# Patient Record
Sex: FEMALE | Race: WHITE | NOT HISPANIC OR LATINO | Employment: UNEMPLOYED | URBAN - METROPOLITAN AREA
[De-identification: names, ages, dates, MRNs, and addresses within clinical notes are randomized per-mention and may not be internally consistent; named-entity substitution may affect disease eponyms.]

---

## 2021-11-18 ENCOUNTER — HISTORICAL (OUTPATIENT)
Dept: ADMINISTRATIVE | Facility: HOSPITAL | Age: 46
End: 2021-11-18

## 2021-11-18 LAB
ABS NEUT (OLG): 3.74 X10(3)/MCL (ref 2.1–9.2)
BASOPHILS # BLD AUTO: 0 X10(3)/MCL (ref 0–0.2)
BASOPHILS NFR BLD AUTO: 1 %
EOSINOPHIL # BLD AUTO: 0.2 X10(3)/MCL (ref 0–0.9)
EOSINOPHIL NFR BLD AUTO: 2 %
ERYTHROCYTE [DISTWIDTH] IN BLOOD BY AUTOMATED COUNT: 15.1 % (ref 11.5–14.5)
FERRITIN SERPL-MCNC: 80.38 NG/ML (ref 4.63–204)
HCT VFR BLD AUTO: 40.4 % (ref 35–46)
HGB BLD-MCNC: 12.7 GM/DL (ref 12–16)
IMM GRANULOCYTES # BLD AUTO: 0.02 10*3/UL
IMM GRANULOCYTES NFR BLD AUTO: 0 %
LYMPHOCYTES # BLD AUTO: 3.3 X10(3)/MCL (ref 0.6–4.6)
LYMPHOCYTES NFR BLD AUTO: 42 %
MCH RBC QN AUTO: 28.7 PG (ref 26–34)
MCHC RBC AUTO-ENTMCNC: 31.4 GM/DL (ref 31–37)
MCV RBC AUTO: 91.4 FL (ref 80–100)
MONOCYTES # BLD AUTO: 0.6 X10(3)/MCL (ref 0.1–1.3)
MONOCYTES NFR BLD AUTO: 8 %
NEUTROPHILS # BLD AUTO: 3.74 X10(3)/MCL (ref 2.1–9.2)
NEUTROPHILS NFR BLD AUTO: 47 %
NRBC BLD AUTO-RTO: 0 % (ref 0–0.2)
PLATELET # BLD AUTO: 283 X10(3)/MCL (ref 130–400)
PMV BLD AUTO: 9.3 FL (ref 7.4–10.4)
RBC # BLD AUTO: 4.42 X10(6)/MCL (ref 4–5.2)
WBC # SPEC AUTO: 7.9 X10(3)/MCL (ref 4.5–11)

## 2021-12-28 ENCOUNTER — HISTORICAL (OUTPATIENT)
Dept: GASTROENTEROLOGY | Facility: CLINIC | Age: 46
End: 2021-12-28

## 2021-12-28 LAB — SARS-COV-2 AG RESP QL IA.RAPID: NEGATIVE

## 2021-12-30 ENCOUNTER — HISTORICAL (OUTPATIENT)
Dept: ENDOSCOPY | Facility: HOSPITAL | Age: 46
End: 2021-12-30

## 2022-01-11 ENCOUNTER — HISTORICAL (OUTPATIENT)
Dept: CARDIOLOGY | Facility: HOSPITAL | Age: 47
End: 2022-01-11

## 2022-03-28 ENCOUNTER — HISTORICAL (OUTPATIENT)
Dept: CARDIOLOGY | Facility: HOSPITAL | Age: 47
End: 2022-03-28

## 2022-03-28 LAB — SARS-COV-2 AG RESP QL IA.RAPID: NEGATIVE

## 2022-04-10 ENCOUNTER — HISTORICAL (OUTPATIENT)
Dept: ADMINISTRATIVE | Facility: HOSPITAL | Age: 47
End: 2022-04-10
Payer: MEDICAID

## 2022-04-27 VITALS
SYSTOLIC BLOOD PRESSURE: 127 MMHG | OXYGEN SATURATION: 98 % | BODY MASS INDEX: 35.05 KG/M2 | WEIGHT: 178.56 LBS | HEIGHT: 60 IN | DIASTOLIC BLOOD PRESSURE: 78 MMHG

## 2022-05-03 NOTE — HISTORICAL [facility]ER
This is a historical note converted from Dorina. Formatting and pictures may have been removed.  Please reference Dorina for original formatting and attached multimedia. Chief Complaint  est care  History of Present Illness  45-year-old white female presents to establish care in the [facility]?accompanied by her cousin. ?She has a past medical history of IV drug abuse,?Hep C (undetectable), latent syphilis,?tobacco use and was recently hospitalized?with?endocarditis and spinal osteomyelitis.? Patient was discharged from [facility]?on?October 18, 2021 on was then placed in an LTAC facility.? Quit drug use?about 8 months ago, but still smoking about 10 cigarettes/day.  Review of Systems  ????Constitutional: No fever, No chills, No weakness, No fatigue.  ?????Eye: No recent visual problem.  ?????Respiratory: No shortness of breath, No cough, No sputum production, No wheezing.  ?????Cardiovascular: No chest pain, No palpitations, No peripheral edema.  ?????Gastrointestinal: No nausea, No vomiting, No diarrhea, No constipation, No heartburn, No abdominal pain.  ?????Genitourinary: No dysuria.  ?????Endocrine: No excessive thirst, No polyuria, No cold intolerance, No heat intolerance.  ?????Musculoskeletal: No back pain, No joint pain, No decreased range of motion.  ?????Integumentary: No rash, No pruritus.  ?????Neurologic: Alert and oriented X4, No numbness, No tingling, No headache.  ?????Psychiatric: No anxiety, No depression.  Physical Exam  Vitals & Measurements  T:?36.8? ?C (Oral)? HR:?88(Peripheral)? RR:?18? BP:?108/74? SpO2:?97%?  HT:?152.40?cm? WT:?80.300?kg? BMI:?34.57?  General: Alert and oriented, No acute distress.  ?????Eye: Pupils are equal, round and reactive to light, Extraocular movements are intact, Normal conjunctiva.  ?????HENT: Normocephalic, Oral mucosa is moist, No pharyngeal erythema.  ?????Neck: Supple, Non-tender.  ?????Respiratory: Lungs are clear to  auscultation, Respirations are non-labored.  ?????Cardiovascular: Normal rate, Regular rhythm, No murmur, Good pulses equal in all extremities, No edema.  ?????Gastrointestinal: Soft, Non-tender, Non-distended, Normal bowel sounds.  ?????Musculoskeletal: Normal range of motion, Normal strength.  ?????Integumentary: Warm, Dry.  ?????Neurologic: Alert, Oriented.  ?????Cognition and Speech: Oriented, Speech clear and coherent.  ?????Psychiatric: Cooperative, Appropriate mood & affect.  Assessment/Plan  Family history of colon cancer?Z80.0  Orders:  CBC w/ Auto Diff, Routine collect, *Est. 11/18/21 3:00:00 CST, Blood, Order for future visit, *Est. Stop date 11/18/21 3:00:00 CST, Lab Collect, Microcytic hypochromic anemia, 11/18/21 9:19:00 CST  CBC w/ Auto Diff, Routine collect, *Est. 02/18/22 3:00:00 CST, Blood, Order for future visit, *Est. Stop date 02/18/22 3:00:00 CST, Lab Collect, Wellness examination  Screening cholesterol level  History of endocarditis  Tobacco use  Osteomyelitis of spine  IV citlalli...  Clinic Follow up, *Est. 02/18/22 3:00:00 CST, FASTING labs, Order for future visit, Wellness examination  Screening cholesterol level  History of endocarditis  Tobacco use  Osteomyelitis of spine  IV drug abuse  Clinic Follow up, *Est. 02/24/22 3:00:00 CST, Order for future visit, Wellness examination  Screening cholesterol level  History of endocarditis  Tobacco use  Osteomyelitis of spine  IV drug abuse  Comprehensive Metabolic Panel, Routine collect, *Est. 02/18/22 3:00:00 CST, Blood, Order for future visit, *Est. Stop date 02/18/22 3:00:00 CST, Lab Collect, Wellness examination  Screening cholesterol level  History of endocarditis  Tobacco use  Osteomyelitis of spine  IV citlalli...  Ferritin, Routine collect, *Est. 11/18/21 3:00:00 CST, Blood, Order for future visit, *Est. Stop date 11/18/21 3:00:00 CST, Lab Collect, Microcytic hypochromic anemia, 11/18/21 9:19:00 CST  Lipid Panel, Routine  collect, *Est. 02/18/22 3:00:00 CST, Blood, Order for future visit, *Est. Stop date 02/18/22 3:00:00 CST, Lab Collect, Wellness examination  Screening cholesterol level  History of endocarditis  Tobacco use  Osteomyelitis of spine  IV citlalli...  Urinalysis with Microscopic if Indicated, Routine collect, Urine, Order for future visit, *Est. 02/18/22 3:00:00 CST, *Est. Stop date 02/18/22 3:00:00 CST, Nurse collect, Wellness examination  Screening cholesterol level  History of endocarditis  Tobacco use  Osteomyelitis of spine  IV d...  ?  1.? Tobacco use:? Advised on cessation.  2.? History of IV drug abuse:? Encouraged abstinence.  3.? Recent history of endocarditis and spinal osteomyelitis:? Will defer to ID, with whom she has an appointment on December 9, 2021.  Urgent referral to cardiology clinic.  4.? Wellness, screening cholesterol:? Fasting labs 1 week prior to follow up in 3 months.  5.? Microcytic Hypochromic Anemia:? CBC with diff, iron studies today.  6.? First degree family history of colon cancer:? Referral to GI lab for colonoscopy.  ?  Patient voiced understanding.  Referrals  [facility] Internal Referral to Cardiology Clinic, Specialty: Cardiology, Reason: Recent history of endocarditis (Oct. 2021), Refer To: Dickson BARRERA, Zaid EASLEY, [facility], [address] [place] [id number]., Start: 11/18/21 9:38:00 CST, Urgent  [facility] Internal Referral to GI Procedure Lab, Specialty: Gastroenterology, Reason: Mother had colon cancer., Type: Procedure, Refer To: Jovana BARRERA, Uzma EASLEY, [facility] GI Procedure Lab , [address] [place] [id number]., Start: 11/18/21 9:20:00 CST, Urgent:  Clinic Follow up, *Est. 02/24/22 3:00:00 CST, Order for future visit, Wellness examination  Screening cholesterol level  History of endocarditis  Tobacco use  Osteomyelitis of spine  IV drug abuse  Clinic Follow up, *Est. 02/18/22 3:00:00 CST, FASTING labs, Order for future visit, Wellness examination  Screening  cholesterol level  History of endocarditis  Tobacco use  Osteomyelitis of spine  IV drug abuse   Problem List/Past Medical History  Ongoing  Obesity  Historical  No qualifying data  Procedure/Surgical History  Insertion of Infusion Device into Left Basilic Vein, Percutaneous Approach (09/29/2021)  Transesophageal Echo (for [facility]) (None) (09/29/2021)  Ultrasonography of Heart with Aorta, Transesophageal (09/29/2021)  Insertion of Infusion Device into Upper Vein, Percutaneous Approach (09/28/2021)  Ultrasonography of Heart with Aorta (09/26/2021)   Medications  cefdinir 300 mg oral capsule, 300 mg= 1 cap(s), Oral, BID  Colace 100 mg oral capsule, 100 mg= 1 cap(s), Oral, BID  furosemide 20 mg oral tablet, 20 mg= 1 tab(s), Oral, TID  Neurontin 300 mg oral capsule, 600 mg= 2 cap(s), Oral, TID  Robaxin 750 mg oral tablet, 750 mg= 1 tab(s), Oral, BID  Allergies  Demerol?(Rash)  Social History  Abuse/Neglect  No, No, Yes, 09/25/2021  Alcohol  Never, 09/25/2021  Employment/School  Unemployed, 11/18/2021  Exercise  Exercise duration: 0., 11/18/2021  Home/Environment  Nutrition/Health  Regular, 11/18/2021  Substance Use  Past, Cocaine, Marijuana, Methamphetamines, 11/18/2021  Tobacco  10 or more cigarettes (1/2 pack or more)/day in last 30 days, No, 11/18/2021  Family History  Diabetes mellitus type 1.: Grandfather and Grandmother.  Hypertension.: Grandfather and Grandmother.Negative: Father.  Primary malignant neoplasm of colon: Mother.  Primary malignant neoplasm of lung: Father.  Health Maintenance  Health Maintenance  ???Pending?(in the next year)  ??? ??OverDue  ??? ? ? ?Alcohol Misuse Screening due??01/02/21??and every 1??year(s)  ??? ??Due?  ??? ? ? ?Blood Pressure Screening due??11/18/21??Unknown Frequency  ??? ? ? ?Body Mass Index Check due??11/18/21??and every 1??year(s)  ??? ? ? ?Depression Screening due??11/18/21??Unknown Frequency  ??? ? ? ?Lipid Screening due??11/18/21??Unknown Frequency  ??? ? ? ?Tetanus  Vaccine due??11/18/21??and every 10??year(s)  ??? ??Due In Future?  ??? ? ? ?Obesity Screening not due until??01/01/22??and every 1??year(s)  ??? ? ? ?Smoking Cessation not due until??01/01/22??and every 1??year(s)  ??? ? ? ?ADL Screening not due until??10/13/22??and every 1??year(s)  ???Satisfied?(in the past 1 year)  ??? ??Satisfied?  ??? ? ? ?ADL Screening on??10/13/21.??Satisfied by Lisset Berkowitz  ??? ? ? ?Blood Pressure Screening on??10/18/21.??Satisfied by Melanie Magana RN  ??? ? ? ?Body Mass Index Check on??10/16/21.??Satisfied by Alena Montague  ??? ? ? ?Diabetes Screening on??10/18/21.??Satisfied by Kathia Galvez  ??? ? ? ?Influenza Vaccine on??10/11/21.??Satisfied by Melanie Magana RN  ??? ? ? ?Lipid Screening on??09/26/21.??Satisfied by Contributor_system, [facility]  ??? ? ? ?Obesity Screening on??10/16/21.??Satisfied by Alena Montague  ??? ? ? ?Smoking Cessation on??10/18/21.??Satisfied by Melanie Magana RN  ?

## 2022-05-03 NOTE — HISTORICAL [facility] CERNER
This is a historical note converted from [employer]ner. Formatting and pictures may have been removed.  Please reference [employer]ner for original formatting and attached multimedia. Chief Complaint  Colonoscopy  History of Present Illness  47 y/o F presenting for screening colonoscopy. Never had a colonoscopy before. States she has some family members who may have had colon cancer, including a grandparent and possibly her mom. Denies any GI symptoms including no blood in the stool, diarrhea, constipation (previously, now improved), nausea, vomiting, or significant weight loss.  Review of Systems  Negative except as above  Physical Exam  NAD  RR  No increased WOB, room air  Abdomen soft, non tender  Assessment/Plan  47 y/o F presenting for screening colonoscopy  ?  - liquid diet yesterday, prep taken as instructed  - consent obtained, risks/benefits/alternatives discussed  - will proceed with colonoscopy this AM  ?  Linda Ramirez MD  [employer] General Surgery, PGY2   Problem List/Past Medical History  Ongoing  Infective endocarditis  Obesity  Historical  Cervical cancer  Cirrhosis of liver  Hepatitis C  Syphilis  Procedure/Surgical History  Insertion of Infusion Device into Left Basilic Vein, Percutaneous Approach (09/29/2021)  Transesophageal Echo (for [facility]) (None) (09/29/2021)  Ultrasonography of Heart with Aorta, Transesophageal (09/29/2021)  Insertion of Infusion Device into Upper Vein, Percutaneous Approach (09/28/2021)  Ultrasonography of Heart with Aorta (09/26/2021)  Gallbladder excision  Partial hysterectomy   Medications  Inpatient  No active inpatient medications  Home  Colace 100 mg oral capsule, 100 mg= 1 cap(s), Oral, BID  doxycycline monohydrate 100 mg oral tablet, 100 mg= 1 tab(s), Oral, q12hr  furosemide 20 mg oral tablet, 20 mg= 1 tab(s), Oral, TID  Neurontin 300 mg oral capsule, 600 mg= 2 cap(s), Oral, TID  Allergies  Demerol?(Rash)  Social History  Abuse/Neglect  No, 12/28/2021  Alcohol  Never,  09/25/2021  Employment/School  Unemployed, 11/18/2021  Exercise  Exercise duration: 0., 11/18/2021  Home/Environment  Nutrition/Health  Regular, 11/18/2021  Substance Use  Past, Cocaine, Marijuana, Methamphetamines, 11/18/2021  Tobacco  10 or more cigarettes (1/2 pack or more)/day in last 30 days, N/A, 12/28/2021  Family History  Diabetes mellitus type 1.: Grandfather and Grandmother.  Hypertension.: Grandfather and Grandmother.Negative: Father.  Primary malignant neoplasm of colon: Mother.  Primary malignant neoplasm of lung: Father.

## 2022-12-12 ENCOUNTER — TELEPHONE (OUTPATIENT)
Dept: INFECTIOUS DISEASES | Facility: CLINIC | Age: 47
End: 2022-12-12
Payer: MEDICAID